# Patient Record
Sex: FEMALE | Race: WHITE
[De-identification: names, ages, dates, MRNs, and addresses within clinical notes are randomized per-mention and may not be internally consistent; named-entity substitution may affect disease eponyms.]

---

## 2021-02-05 ENCOUNTER — HOSPITAL ENCOUNTER (EMERGENCY)
Dept: HOSPITAL 92 - ERS | Age: 21
Discharge: HOME | End: 2021-02-05
Payer: COMMERCIAL

## 2021-02-05 DIAGNOSIS — F10.129: Primary | ICD-10-CM

## 2021-02-05 PROCEDURE — 96374 THER/PROPH/DIAG INJ IV PUSH: CPT

## 2021-02-05 PROCEDURE — 70450 CT HEAD/BRAIN W/O DYE: CPT

## 2021-02-05 PROCEDURE — 72125 CT NECK SPINE W/O DYE: CPT

## 2021-02-05 PROCEDURE — 36415 COLL VENOUS BLD VENIPUNCTURE: CPT

## 2021-02-05 PROCEDURE — 80307 DRUG TEST PRSMV CHEM ANLYZR: CPT

## 2021-02-05 NOTE — CT
PRELIMINARY REPORT/[facility]/EMERGENCY AFTER HOURS PROCEDURE:



EXAM: CT Head Without Intravenous Contrast. 



CLINICAL HISTORY: Pt found unresponsive at a  local [facility] bathroom. unknown LOC or if she hit 
her head. 



TECHNIQUE: Axial computed tomography images of the head/brain without intravenous contrast. 



COMPARISON: None provided. 



FINDINGS: 

BRAIN: No acute intraparenchymal hemorrhage. No mass lesion. No CT evidence for acute territorial inf
arct. No midline shift or extra-axial collection. 

VENTRICLES: No hydrocephalus. 

ORBITS: The orbits are unremarkable. 

SINUSES AND MASTOIDS: Moderate opacification of the ethmoid, sphenoid and maxillary sinuses.  Slight 
opacification of the frontal sinuses.. 

SOFT TISSUES: No significant facial or scalp soft tissue swelling evident. No radiopaque foreign body
 is seen. 

BONES: No acute skull fracture. 



IMPRESSION: No acute intracranial abnormality.  There is moderate opacification of the paranasal sinu
ses as described.



ELECTRONICALLY SIGNED BY:

Vera Hernandez DO

Feb 5, 2021 3:16:43 AM CST





FINAL REPORT



HEAD CT WITHOUT CONTRAST:



DATE: 5/20/2021.



COMPARISON: 

None.



HISTORY: 

Found unconscious.



FINDINGS: 

I agree with the preliminary report. There is significant partial opacification of the paranasal sinu
ses which includes inspissated hyperdense secretions within the left maxillary sinus, partial

opacification of bilateral sphenoid, posterior ethmoid air cell opacification, left greater than righ
t, and mucosal thickening of bilateral frontal sinuses. No intracranial hemorrhage, midline shift,

mass effect, or ventricular enlargement. No displaced calvarial fracture.



IMPRESSION: 

Paranasal sinus opacification. No intracranial hemorrhage or displaced calvarial fracture.



Transcribed Date/Time: 2/5/2021 7:51 AM



Reported By: Bryan Aquino 

Electronically Signed:  2/5/2021 8:14 AM

## 2021-02-05 NOTE — CT
PRELIMINARY REPORT/[facility]/EMERGENCY AFTER HOURS PROCEDURE



EXAM: 

CT Cervical Spine Without Intravenous Contrast. 



CLINICAL HISTORY: 

Pt found unresponsive at a  local [facility] bathroom. unknown LOC or if she hit her head. 



TECHNIQUE: 

Axial computed tomography images of the cervical spine without intravenous contrast. Sagittal and cor
onal reformations performed. 



COMPARISON: 

None provided. 



FINDINGS: 



BONES: 

No acute fracture or focal osseous lesion. 

Bony alignment is anatomic. 



DISCS / DEGENERATIVE CHANGES: 

No significant disc or facet degeneration. 

No significant central canal or neural foraminal stenosis. 



SOFT TISSUES: 

No prevertebral soft tissue swelling. No apical pneumothorax. 



IMPRESSION: 

No acute cervical spine abnormality.





ELECTRONICALLY SIGNED BY:

Vera Hernandez DO

Feb 5, 2021 3:18:06 AM CST



This report is intended for review by the ordering physician only, in accordance of law. If you recei
ve this report in error, please call [facility] at [phone].



 

FINAL REPORT 



Final interpretation

CT cervical spine without contrast:

To 5/20/2021



COMPARISON: None.



HISTORY: Found unresponsive.



FINDINGS: I agree with the preliminary report. There is significant opacification of partially visual
ized bilateral sphenoid sinuses. The C1 ring is intact. Cervical vertebral body height and

alignment appears normal. The atlantoaxial interspace, dens, C1-2 articulation, occipital condyles, c
raniocervical junction, and cervicothoracic junction appear unremarkable. No acute fracture.



IMPRESSION: No acute osseous abnormality.



Code QA



Transcribed Date/Time: 2/5/2021 7:51 AM



Reported By: Bryan Aquino 

Electronically Signed:  2/5/2021 8:13 AM